# Patient Record
Sex: MALE | Race: WHITE | NOT HISPANIC OR LATINO | Employment: OTHER | URBAN - METROPOLITAN AREA
[De-identification: names, ages, dates, MRNs, and addresses within clinical notes are randomized per-mention and may not be internally consistent; named-entity substitution may affect disease eponyms.]

---

## 2020-11-10 ENCOUNTER — APPOINTMENT (OUTPATIENT)
Dept: GENERAL RADIOLOGY | Facility: HOSPITAL | Age: 50
End: 2020-11-10

## 2020-11-10 ENCOUNTER — HOSPITAL ENCOUNTER (EMERGENCY)
Facility: HOSPITAL | Age: 50
Discharge: HOME | End: 2020-11-10
Attending: EMERGENCY MEDICINE | Admitting: EMERGENCY MEDICINE

## 2020-11-10 VITALS
WEIGHT: 204 LBS | OXYGEN SATURATION: 97 % | TEMPERATURE: 97.9 F | RESPIRATION RATE: 18 BRPM | HEART RATE: 107 BPM | SYSTOLIC BLOOD PRESSURE: 146 MMHG | HEIGHT: 69 IN | BODY MASS INDEX: 30.21 KG/M2 | DIASTOLIC BLOOD PRESSURE: 109 MMHG

## 2020-11-10 DIAGNOSIS — M20.011 MALLET DEFORMITY OF RIGHT RING FINGER: Primary | ICD-10-CM

## 2020-11-10 PROCEDURE — 99283 EMERGENCY DEPT VISIT LOW MDM: CPT

## 2020-11-10 PROCEDURE — 73140 X-RAY EXAM OF FINGER(S): CPT

## 2020-11-10 NOTE — ED TRIAGE NOTES
"Pt states he injured his finger yesterday while walking his dogs.  States he is not sure what happened, \"maybe got pulled by leash\".  Pt has bandage on R ring finger.  Mask placed on patient in triage.  Triage RN wearing mask throughout encounter.    "

## 2020-11-10 NOTE — ED PROVIDER NOTES
EMERGENCY DEPARTMENT ENCOUNTER    Room Number:  14/14  Date of encounter:  11/10/2020  PCP: Ethan Mayes MD  Historian: Patient      HPI:  Chief Complaint: Right finger injury  A complete HPI/ROS/PMH/PSH/SH/FH are unobtainable due to: None    Context: Deb Mtz is a 50 y.o. male who presents to the ED via private vehicle for pain and deformity to his right ring finger after he injured it while walking his dogs yesterday.  States that the dog got excited and pulled on the leash and he immediately had pain.  Notes some deformity to the end of the right ring finger, has already placed into a splint prior to arrival.  No other injuries or concerns at this time.      MEDICAL RECORD REVIEW    No medical allergies listed on chart review in epic    PAST MEDICAL HISTORY  Active Ambulatory Problems     Diagnosis Date Noted   • No Active Ambulatory Problems     Resolved Ambulatory Problems     Diagnosis Date Noted   • No Resolved Ambulatory Problems     Past Medical History:   Diagnosis Date   • ADD (attention deficit disorder)          PAST SURGICAL HISTORY  No past surgical history on file.      FAMILY HISTORY  No family history on file.      SOCIAL HISTORY  Social History     Socioeconomic History   • Marital status:      Spouse name: Not on file   • Number of children: Not on file   • Years of education: Not on file   • Highest education level: Not on file   Tobacco Use   • Smoking status: Current Every Day Smoker   • Smokeless tobacco: Never Used   Substance and Sexual Activity   • Alcohol use: Yes         ALLERGIES  Patient has no known allergies.        REVIEW OF SYSTEMS  Review of Systems     All systems reviewed and negative except for those discussed in HPI.       PHYSICAL EXAM    I have reviewed the triage vital signs and nursing notes.    ED Triage Vitals [11/10/20 0825]   Temp Heart Rate Resp BP SpO2   97.9 °F (36.6 °C) 103 16 -- 97 %      Temp src Heart Rate Source Patient Position BP Location FiO2  (%)   -- -- -- -- --       Physical Exam  General: Awake, alert, no acute distress  HEENT: EOMI  Pulm: Symmetric chest rise, nonlabored breathing  CV: Regular rate and rhythm  GI: Non-distended  MSK: Mallet finger deformity of the right 4th finger  Skin: Warm, dry  Neuro: Alert and oriented x 3, moving all extremities, no focal deficits  Psych: Calm, cooperative    Vital signs and nursing notes reviewed.       Surgical mask, protective eye goggles, and gloves used during this encounter. Patient in surgical mask.      LAB RESULTS  No results found for this or any previous visit (from the past 24 hour(s)).          RADIOLOGY  Xr Finger 2+ View Right    Result Date: 11/10/2020  PROCEDURE:  XR FINGER 2+ VW RIGHT-  HISTORY: Injury to right distal fourth digit yesterday.  COMPARISON: None.  FINDINGS:   3 views of the right fourth digit were obtained.  No acute fracture is identified. There is a palmar flexion of the fourth distal interphalangeal joint, which could be positional and/or due to underlying ligamentous injury, which would be better assessed with MRI. Joint spaces are maintained.  This report was finalized on 11/10/2020 9:20 AM by Dr. Esha Da Silva M.D.        I ordered the above noted radiological studies. Reviewed by me.  See dictation for official radiology interpretation.      PROCEDURES    Procedures      MEDICATIONS GIVEN IN ER    Medications - No data to display      PROGRESS, DATA ANALYSIS, CONSULTS, AND MEDICAL DECISION MAKING    All labs have been independently reviewed by me.  All radiology studies have been reviewed by me and discussed with radiologist dictating the report.   EKG's independently viewed and interpreted by me.  Discussion below represents my analysis of pertinent findings related to patient's condition, differential diagnosis, treatment plan and final disposition.    Exam consistent with mallet finger, will get x-ray today for any potential fractures.  Will place into a splint with  orthopedic follow-up.    ED Course as of Nov 10 1351   Tue Nov 10, 2020   2658 Patient updated on the x-ray without fracture.  Discussed that this is possibly an extensor tendon issue, have placed into a finger splint to help keep in better straight alignment.  Will give orthopedic hand follow-up.  ED return for worsening symptoms as needed.    [DC]      ED Course User Index  [DC] Jose Raul Paige MD       AS OF 13:51 EST VITALS:    BP - (!) 146/109  HR - 107  TEMP - 97.9 °F (36.6 °C)  02 SATS - 97%        DIAGNOSIS  Final diagnoses:   Mallet deformity of right ring finger         DISPOSITION  DISCHARGE    Patient discharged in stable condition.    Reviewed implications of results, diagnosis, meds, responsibility to follow up, warning signs and symptoms of possible worsening, potential complications and reasons to return to ER.    Patient/Family voiced understanding of above instructions.    Discussed plan for discharge, as there is no emergent indication for admission. Patient referred to primary care provider for BP management due to today's BP. Pt/family is agreeable and understands need for follow up and repeat testing.  Pt is aware that discharge does not mean that nothing is wrong but it indicates no emergency is present that requires admission and they must continue care with follow-up as given below or physician of their choice.     FOLLOW-UP  [facility]  [address]  [facility] [address] [phone]    As needed, If symptoms worsen    Armando Locke MD  [address]  [facility] [address]  [phone]    Call on 11/10/2020           Medication List      No changes were made to your prescriptions during this visit.                    Jose Raul Paige MD  11/10/20 [id number]

## 2020-11-10 NOTE — DISCHARGE INSTRUCTIONS
Keep your finger in the splint in a straight or extended position.  Follow-up with orthopedics as discussed.   Tylenol or ibuprofen for pain as needed.  ED return for worsening symptoms as needed.

## 2021-04-11 ENCOUNTER — IMMUNIZATION (OUTPATIENT)
Dept: VACCINE CLINIC | Facility: HOSPITAL | Age: 51
End: 2021-04-11

## 2021-04-11 PROCEDURE — 0001A: CPT | Performed by: INTERNAL MEDICINE

## 2021-04-11 PROCEDURE — 91300 HC SARSCOV02 VAC 30MCG/0.3ML IM: CPT | Performed by: INTERNAL MEDICINE

## 2021-05-02 ENCOUNTER — IMMUNIZATION (OUTPATIENT)
Dept: VACCINE CLINIC | Facility: HOSPITAL | Age: 51
End: 2021-05-02

## 2021-05-02 PROCEDURE — 91300 HC SARSCOV02 VAC 30MCG/0.3ML IM: CPT | Performed by: INTERNAL MEDICINE

## 2021-05-02 PROCEDURE — 0002A: CPT | Performed by: INTERNAL MEDICINE

## 2021-12-03 ENCOUNTER — IMMUNIZATION (OUTPATIENT)
Dept: VACCINE CLINIC | Facility: HOSPITAL | Age: 51
End: 2021-12-03

## 2021-12-03 PROCEDURE — 0004A HC ADM SARSCOV2 30MCG/0.3ML BOOSTER: CPT | Performed by: INTERNAL MEDICINE

## 2021-12-03 PROCEDURE — 91300 HC SARSCOV02 VAC 30MCG/0.3ML IM: CPT | Performed by: INTERNAL MEDICINE
